# Patient Record
Sex: FEMALE | Race: WHITE | Employment: OTHER | URBAN - METROPOLITAN AREA
[De-identification: names, ages, dates, MRNs, and addresses within clinical notes are randomized per-mention and may not be internally consistent; named-entity substitution may affect disease eponyms.]

---

## 2021-04-29 ENCOUNTER — APPOINTMENT (OUTPATIENT)
Dept: CT IMAGING | Age: 81
End: 2021-04-29
Attending: NURSE PRACTITIONER
Payer: MEDICARE

## 2021-04-29 ENCOUNTER — HOSPITAL ENCOUNTER (EMERGENCY)
Age: 81
Discharge: HOME | End: 2021-04-29
Attending: EMERGENCY MEDICINE
Payer: MEDICARE

## 2021-04-29 ENCOUNTER — APPOINTMENT (OUTPATIENT)
Dept: NON INVASIVE DIAGNOSTICS | Age: 81
End: 2021-04-29
Attending: NURSE PRACTITIONER
Payer: MEDICARE

## 2021-04-29 VITALS
DIASTOLIC BLOOD PRESSURE: 83 MMHG | RESPIRATION RATE: 23 BRPM | OXYGEN SATURATION: 97 % | HEART RATE: 77 BPM | WEIGHT: 141 LBS | SYSTOLIC BLOOD PRESSURE: 106 MMHG | TEMPERATURE: 97.5 F

## 2021-04-29 DIAGNOSIS — R05.9 COUGH: ICD-10-CM

## 2021-04-29 DIAGNOSIS — R06.00 DYSPNEA, UNSPECIFIED TYPE: ICD-10-CM

## 2021-04-29 LAB
ALBUMIN SERPL-MCNC: 3.2 G/DL (ref 3.5–5)
ALBUMIN/GLOB SERPL: 0.7 {RATIO} (ref 1.1–2.2)
ALP SERPL-CCNC: 140 U/L (ref 45–117)
ALT SERPL-CCNC: 20 U/L (ref 12–78)
ANION GAP SERPL CALC-SCNC: 7 MMOL/L (ref 5–15)
AST SERPL-CCNC: 27 U/L (ref 15–37)
BASOPHILS # BLD: 0.2 K/UL (ref 0–0.1)
BASOPHILS NFR BLD: 3 % (ref 0–1)
BILIRUB SERPL-MCNC: 0.4 MG/DL (ref 0.2–1)
BNP SERPL-MCNC: 119 PG/ML
BUN SERPL-MCNC: 20 MG/DL (ref 6–20)
BUN/CREAT SERPL: 22 (ref 12–20)
CALCIUM SERPL-MCNC: 8.8 MG/DL (ref 8.5–10.1)
CHLORIDE SERPL-SCNC: 106 MMOL/L (ref 97–108)
CO2 SERPL-SCNC: 26 MMOL/L (ref 21–32)
COMMENT, HOLDF: NORMAL
CREAT SERPL-MCNC: 0.9 MG/DL (ref 0.55–1.02)
DIFFERENTIAL METHOD BLD: ABNORMAL
ECHO AO ASC DIAM: 2.81 CM
ECHO AO ROOT DIAM: 3.2 CM
ECHO AV AREA PEAK VELOCITY: 2.93 CM2
ECHO AV AREA VTI: 2.41 CM2
ECHO AV MEAN GRADIENT: 5.73 MMHG
ECHO AV PEAK GRADIENT: 8.83 MMHG
ECHO AV PEAK VELOCITY: 148.58 CM/S
ECHO AV VTI: 32.45 CM
ECHO IVC PROX: 1.71 CM
ECHO LA MAJOR AXIS: 2.64 CM
ECHO LA VOL 2C: 26.53 ML (ref 22–52)
ECHO LA VOL 4C: 30.44 ML (ref 22–52)
ECHO LA VOL BP: 33.17 ML (ref 22–52)
ECHO LV INTERNAL DIMENSION DIASTOLIC: 3.36 CM (ref 3.9–5.3)
ECHO LV INTERNAL DIMENSION SYSTOLIC: 2.45 CM
ECHO LV IVSD: 1.04 CM (ref 0.6–0.9)
ECHO LV MASS 2D: 103 G (ref 67–162)
ECHO LV POSTERIOR WALL DIASTOLIC: 1.04 CM (ref 0.6–0.9)
ECHO LVOT DIAM: 2.02 CM
ECHO LVOT PEAK GRADIENT: 7.33 MMHG
ECHO LVOT PEAK VELOCITY: 135.33 CM/S
ECHO LVOT SV: 78.4 ML
ECHO LVOT VTI: 24.36 CM
ECHO MV A VELOCITY: 74.8 CM/S
ECHO MV E DECELERATION TIME (DT): 270.83 MS
ECHO MV E VELOCITY: 62.83 CM/S
ECHO MV E/A RATIO: 0.84
ECHO PV PEAK INSTANTANEOUS GRADIENT SYSTOLIC: 4.88 MMHG
ECHO PV REGURGITANT END DIASTOLIC MAX VELOCITY: 79.44 CM/S
ECHO RV INTERNAL DIMENSION: 3.04 CM
ECHO TV REGURGITANT MAX VELOCITY: 246.96 CM/S
ECHO TV REGURGITANT PEAK GRADIENT: 24.4 MMHG
EOSINOPHIL # BLD: 0.1 K/UL (ref 0–0.4)
EOSINOPHIL NFR BLD: 2 % (ref 0–7)
ERYTHROCYTE [DISTWIDTH] IN BLOOD BY AUTOMATED COUNT: 13.2 % (ref 11.5–14.5)
GLOBULIN SER CALC-MCNC: 4.5 G/DL (ref 2–4)
GLUCOSE SERPL-MCNC: 95 MG/DL (ref 65–100)
HCT VFR BLD AUTO: 40.3 % (ref 35–47)
HGB BLD-MCNC: 13.1 G/DL (ref 11.5–16)
IMM GRANULOCYTES # BLD AUTO: 0 K/UL
IMM GRANULOCYTES NFR BLD AUTO: 0 %
LVOT MG: 4.15 MMHG
LYMPHOCYTES # BLD: 1.5 K/UL (ref 0.8–3.5)
LYMPHOCYTES NFR BLD: 29 % (ref 12–49)
MCH RBC QN AUTO: 31 PG (ref 26–34)
MCHC RBC AUTO-ENTMCNC: 32.5 G/DL (ref 30–36.5)
MCV RBC AUTO: 95.3 FL (ref 80–99)
MONOCYTES # BLD: 0.5 K/UL (ref 0–1)
MONOCYTES NFR BLD: 10 % (ref 5–13)
NEUTS SEG # BLD: 2.8 K/UL (ref 1.8–8)
NEUTS SEG NFR BLD: 56 % (ref 32–75)
NRBC # BLD: 0 K/UL (ref 0–0.01)
NRBC BLD-RTO: 0 PER 100 WBC
PLATELET # BLD AUTO: 269 K/UL (ref 150–400)
PMV BLD AUTO: 9.3 FL (ref 8.9–12.9)
POTASSIUM SERPL-SCNC: 4 MMOL/L (ref 3.5–5.1)
PROT SERPL-MCNC: 7.7 G/DL (ref 6.4–8.2)
RBC # BLD AUTO: 4.23 M/UL (ref 3.8–5.2)
RBC MORPH BLD: ABNORMAL
SAMPLES BEING HELD,HOLD: NORMAL
SODIUM SERPL-SCNC: 139 MMOL/L (ref 136–145)
TROPONIN I SERPL-MCNC: <0.05 NG/ML
WBC # BLD AUTO: 5.1 K/UL (ref 3.6–11)
WBC MORPH BLD: ABNORMAL

## 2021-04-29 PROCEDURE — 71275 CT ANGIOGRAPHY CHEST: CPT

## 2021-04-29 PROCEDURE — 84484 ASSAY OF TROPONIN QUANT: CPT

## 2021-04-29 PROCEDURE — 93306 TTE W/DOPPLER COMPLETE: CPT

## 2021-04-29 PROCEDURE — 80053 COMPREHEN METABOLIC PANEL: CPT

## 2021-04-29 PROCEDURE — 83880 ASSAY OF NATRIURETIC PEPTIDE: CPT

## 2021-04-29 PROCEDURE — A9270 NON-COVERED ITEM OR SERVICE: HCPCS | Performed by: EMERGENCY MEDICINE

## 2021-04-29 PROCEDURE — 93005 ELECTROCARDIOGRAM TRACING: CPT

## 2021-04-29 PROCEDURE — 74011250636 HC RX REV CODE- 250/636: Performed by: NURSE PRACTITIONER

## 2021-04-29 PROCEDURE — 74011636637 HC RX REV CODE- 636/637: Performed by: EMERGENCY MEDICINE

## 2021-04-29 PROCEDURE — 96374 THER/PROPH/DIAG INJ IV PUSH: CPT

## 2021-04-29 PROCEDURE — 36415 COLL VENOUS BLD VENIPUNCTURE: CPT

## 2021-04-29 PROCEDURE — 99285 EMERGENCY DEPT VISIT HI MDM: CPT

## 2021-04-29 PROCEDURE — 85025 COMPLETE CBC W/AUTO DIFF WBC: CPT

## 2021-04-29 PROCEDURE — 99284 EMERGENCY DEPT VISIT MOD MDM: CPT | Performed by: INTERNAL MEDICINE

## 2021-04-29 PROCEDURE — 74011000636 HC RX REV CODE- 636: Performed by: RADIOLOGY

## 2021-04-29 RX ORDER — PREDNISONE 20 MG/1
60 TABLET ORAL
Status: COMPLETED | OUTPATIENT
Start: 2021-04-29 | End: 2021-04-29

## 2021-04-29 RX ORDER — METHYLPREDNISOLONE 4 MG/1
TABLET ORAL
Qty: 1 DOSE PACK | Refills: 0 | Status: SHIPPED | OUTPATIENT
Start: 2021-04-29

## 2021-04-29 RX ORDER — FUROSEMIDE 10 MG/ML
40 INJECTION INTRAMUSCULAR; INTRAVENOUS ONCE
Status: COMPLETED | OUTPATIENT
Start: 2021-04-29 | End: 2021-04-29

## 2021-04-29 RX ADMIN — PREDNISONE 60 MG: 20 TABLET ORAL at 16:33

## 2021-04-29 RX ADMIN — FUROSEMIDE 40 MG: 10 INJECTION, SOLUTION INTRAMUSCULAR; INTRAVENOUS at 14:22

## 2021-04-29 RX ADMIN — IOPAMIDOL 80 ML: 755 INJECTION, SOLUTION INTRAVENOUS at 11:46

## 2021-04-29 NOTE — ED TRIAGE NOTES
Pt arrives to ED after being seen and sent at [facility] for SOB, coughing x 4 weeks. Pt has been on Doxycycline  for 10 days for bronchitis, but does has no relief. Pt denies fever, chills, NVD.

## 2021-04-29 NOTE — ED NOTES
Verbal shift change report given to [facility] Lisa RN/Jonny RN (oncoming nurse) by Spring Curry RN (offgoing nurse). Report included the following information SBAR, Kardex, ED Summary, [facility] and Recent Results.

## 2021-04-29 NOTE — ED NOTES
Pt given discharge instructions and verbalized understanding. Pt ambulatory from ED to home with daughter as  of vehicle.

## 2021-04-29 NOTE — ED NOTES
10:59 AM  I have evaluated the patient as the Provider in Triage. I have reviewed Her vital signs and the triage nurse assessment. I have talked with the patient and any available family and advised that I am the provider in triage and have ordered the appropriate study to initiate their work up based on the clinical presentation during my assessment. I have advised that the patient will be accommodated in the Main ED as soon as possible. I have also requested to contact the triage nurse or myself immediately if the patient experiences any changes in their condition during this brief waiting period. Comes to the emergency room today for increased shortness of breath and cough. Patient has recently finished a 10-day course of doxycycline, been to [facility] twice and sent to the emergency room for evaluation regarding worsening symptoms. Denies chest pain, diaphoresis, fever or chills.   Twyla Cortez NP

## 2021-04-29 NOTE — ED PROVIDER NOTES
67-year-old female with a history of tobacco use of approximately 30 years ago presents with a chief complaint of cough and shortness of breath. The patient states that she has had the symptoms for the last 4 weeks. She went to an urgent care facility recently and completed a course of doxycycline which did not improve her symptoms. She denies any chest pain, fevers, chills, abdominal pain, GI or urinary symptoms. She has not had any unintentional weight loss recently. She does have a approximately 30-pack-year history of smoking. She has not had any recent CT imaging of her chest.           No past medical history on file. No past surgical history on file. No family history on file.     Social History     Socioeconomic History    Marital status: Not on file     Spouse name: Not on file    Number of children: Not on file    Years of education: Not on file    Highest education level: Not on file   Occupational History    Not on file   Social Needs    Financial resource strain: Not on file    Food insecurity     Worry: Not on file     Inability: Not on file    Transportation needs     Medical: Not on file     Non-medical: Not on file   Tobacco Use    Smoking status: Not on file   Substance and Sexual Activity    Alcohol use: Not on file    Drug use: Not on file    Sexual activity: Not on file   Lifestyle    Physical activity     Days per week: Not on file     Minutes per session: Not on file    Stress: Not on file   Relationships    Social connections     Talks on phone: Not on file     Gets together: Not on file     Attends [employer] service: Not on file     Active member of club or organization: Not on file     Attends meetings of clubs or organizations: Not on file     Relationship status: Not on file    Intimate partner violence     Fear of current or ex partner: Not on file     Emotionally abused: Not on file     Physically abused: Not on file     Forced sexual activity: Not on file Other Topics Concern    Not on file   Social History Narrative    Not on file         ALLERGIES: Cephalosporins    Review of Systems   Constitutional: Negative for fever. HENT: Negative for rhinorrhea. Respiratory: Positive for cough and shortness of breath. Cardiovascular: Negative for chest pain. Gastrointestinal: Negative for abdominal pain. Genitourinary: Negative for dysuria. Musculoskeletal: Negative for back pain. Skin: Negative for wound. Neurological: Negative for headaches. Psychiatric/Behavioral: Negative for confusion. Vitals:    04/29/21 1013 04/29/21 1025   BP: 113/71    Pulse: 89    Resp: 18    Temp: 97.5 °F (36.4 °C)    SpO2: 97% 98%   Weight: 64.3 kg (141 lb 12.1 oz)             Physical Exam  Vitals signs and nursing note reviewed. Constitutional:       General: She is not in acute distress. Appearance: Normal appearance. She is well-developed. She is not ill-appearing, toxic-appearing or diaphoretic. HENT:      Head: Normocephalic and atraumatic. Eyes:      Extraocular Movements: Extraocular movements intact. Neck:      Musculoskeletal: Normal range of motion. Cardiovascular:      Rate and Rhythm: Normal rate and regular rhythm. Pulses: Normal pulses. Heart sounds: Normal heart sounds. Pulmonary:      Effort: Pulmonary effort is normal. No respiratory distress. Breath sounds: Rales present. No decreased breath sounds, wheezing or rhonchi. Abdominal:      General: There is no distension. Palpations: Abdomen is soft. Tenderness: There is no abdominal tenderness. Musculoskeletal: Normal range of motion. Skin:     General: Skin is warm and dry. Neurological:      Mental Status: She is alert and oriented to person, place, and time.    Psychiatric:         Mood and Affect: Mood normal.          MDM  Number of Diagnoses or Management Options  Cough  Dyspnea, unspecified type  Diagnosis management comments: 71-year-old female presents with shortness of breath and cough. She does have a 30-pack-year history of smoking although this was 30 years ago. She does have minor rales on exam.  Other differentials include but not limited to congestive heart failure, pneumonia, PE, malignancy among others. CT of the chest was obtained and shows minimal bilateral interstitial edema with no evidence of malignancy or pulmonary embolism. Patient's BNP is normal.  Her laboratory studies are unremarkable and her EKG is nonischemic. I will consult cardiology to get their input regarding the possibility of congestive heart failure. Patient was evaluated by the cardiology nurse practitioner and an echo was ordered. The echo is pending at this time and patient will be signed out to the afternoon attending pending echo and disposition. EKG shows a sinus rhythm at a rate of 74, normal intervals, left axis deviation, no ischemic changes. 3 PM  Change of shift. Care of patient signed over to Dr. Enid Kang. Bedside handoff complete. Awaiting echo and dispo.           Amount and/or Complexity of Data Reviewed  Clinical lab tests: ordered and reviewed  Tests in the radiology section of CPT®: ordered and reviewed           Procedures

## 2021-04-29 NOTE — CONSULTS
[facility] [address] [id number]  Mobile [phone]    Cardiology Consult Note    Date of  Admission: 2021 10:16 AM  PCP- Gus Wakefield MD Other, MD Gus  :  1940   MRN:  [id number]     CC: SOB & cough  Reason for consult: CHF  Admission Diagnosis: No admission diagnoses are documented for this encounter. Efrain Ann is a 79 y.o. female admitted for No admission diagnoses are documented for this encounter. . Consult requested by Sathish Hillman MD       Subjective:   No admission diagnoses are documented for this encounter. Impression Plan/Recommendation   1. Pulmonary edema on CTA  2. Cough/SOB  3. Ex-Smoker (remote +30 years                · CTA of chest showing minimal pulmonary edema, normal BNP. Neg troponin. No PE  · ecg NSR, no acute St changes   · Will obtain echo  · Give one dose of lasix IV- crackles in les bases, clinically euvolemic on exam. No in acute HF  · If echo okay for d/c. Reviewed above plan w the patient, daughter, ER attending and Dr Marilee Bowens      Pt personally seen and examined. Chart reviewed. Agree with advanced NP's history, exam and  A/P with changes/additons. 79 yr old CF admitted for cough/dyspnea    Blood pressure 106/83, pulse 77, temperature 97.5 °F (36.4 °C), resp. rate 23, weight 141 lb (64 kg), SpO2 97 %. Neck-no JVD  CVS-S1-S2 present, no murmur      Trop neg; EKG- SR/NSTT    Clinically not hypervolemic    21   ECHO ADULT COMPLETE 2021    Narrative · LV: Estimated LVEF is 55 - 60%. Normal cavity size, wall thickness and   systolic function (ejection fraction normal). Wall motion: normal. Mild   (grade 1) left ventricular diastolic dysfunction. Signed by: Kassi Carrasco MD     Can be DC'd from cardiac standpoint.  Can f/u as OP on prn basis\        Discussed with patient/nursing/family    Heron Alatorre Jessica Barnhart MD, [facility] [place] [facility]        Subjective:  Ernestina Feliz is a 79 y.o. female I am seeing for CHF. PMhx includes ex smoker quit over 30 years ago. Patient presents w SOB and cough x 4 weeks. She was treated for bronchitis w doxycycline w no improvement her symptoms. No associated denies palpitations, irregular heart beat, chest pain or LE edema. Family hx: mother hypertrophic CM ( she did not have a ICD/no hx of CAD)  Social hx: smoker x 30 years quit over 30 years ago. Denies etoh. Lives w daughter. Independent- does all activities for daily living. Walks . No past medical history on file. No past surgical history on file. Hospital Medications:  No current facility-administered medications for this encounter. No current outpatient medications on file. No current facility-administered medications on file prior to encounter. No current outpatient medications on file prior to encounter. Allergies   Allergen Reactions    Cephalosporins Hives             Review of Symptoms:  All other systems reviewed are negative except as above. Physical Exam    Visit Vitals  /83   Pulse 71   Temp 97.5 °F (36.4 °C)   Resp 23   Wt 141 lb 12.1 oz (64.3 kg)   SpO2 97%     General Appearance:  Well developed, well nourished,alert and oriented x 3, and individual in no acute distress. Ears/Nose/Mouth/Throat:   Hearing grossly normal.Normal oral mucosa,no scleral icterus     Neck: Supple no JVD    Chest:   Crackles les bases, congested cough   Cardiovascular:  Regular rate and rhythm, no Murmur. Abdomen:   Soft, non-tender, bowel sounds are active. Extremities: No edema bilaterally. Pulses detected, no varicosities   Skin: Warm and dry.  No bruising  Neuro  Moves all extermities and neurologically intact                                                       Cardiographics    Telemetry: NSR  ECG: as above    Cardiac testing      Recent Labs     04/29/21  1044   TROIQ <0.05 Recent Labs     04/29/21  1044      K 4.0   CO2 26   BUN 20   CREA 0.90   GLU 95   WBC 5.1   HGB 13.1   HCT 40.3          I have discussed the diagnosis with the patient and the intended plan as seen in the above orders. Questions were answered concerning future plans. I have discussed medication side effects and warnings with the patient as well. Giuseppe Neumann is in agreement to the plan listed above and wishes to proceed. she  was instructed not to smoke, eat heart healthy diet  and to exercise. Thank you for this consult.     Mata Wells NP

## 2021-04-29 NOTE — ED NOTES
I took over care from Dr. Stevo Mancera. Patient had a CTA that was negative for PE. No evidence of acute coronary syndrome. There was some fluid seen on the CT, so cardiology was involved and ordered a stat echo. The nurse practitioner, Elder Sutton, from cardiology called to tell me the echo was reassuring. From a cardiology standpoint the patient was cleared and would not need further work-up. She is breathing well, but has occasional cough. She has been on antibiotics, but does not seem to be a bacterial infection. She has been vaccinated for Covid for a few months. She has a smoking history, but has not been on any steroids. Endometritis to see if it helps with some of the inflammation. Have also referred her to pulmonology and her primary care doctor. We discussed admission, but the patient was not interested and I also think she should be able to get things done as an outpatient. I do not think she absolutely has to be admitted and I expressed this thoughts to her and her daughter. Made clear that if her breathing gets worse or she has any chest pain or something seems wrong to return to the emergency department immediately.

## 2021-04-30 LAB
ATRIAL RATE: 74 BPM
CALCULATED P AXIS, ECG09: 61 DEGREES
CALCULATED R AXIS, ECG10: -59 DEGREES
CALCULATED T AXIS, ECG11: 54 DEGREES
DIAGNOSIS, 93000: NORMAL
P-R INTERVAL, ECG05: 182 MS
Q-T INTERVAL, ECG07: 390 MS
QRS DURATION, ECG06: 82 MS
QTC CALCULATION (BEZET), ECG08: 432 MS
VENTRICULAR RATE, ECG03: 74 BPM

## 2023-05-15 RX ORDER — METHYLPREDNISOLONE 4 MG/1
TABLET ORAL
COMMUNITY
Start: 2021-04-29

## 2025-05-07 ENCOUNTER — TRANSCRIBE ORDERS (OUTPATIENT)
Facility: HOSPITAL | Age: 85
End: 2025-05-07

## 2025-05-07 ENCOUNTER — HOSPITAL ENCOUNTER (OUTPATIENT)
Facility: HOSPITAL | Age: 85
Discharge: HOME | End: 2025-05-10
Payer: MEDICARE

## 2025-05-07 DIAGNOSIS — R05.9 COUGH, UNSPECIFIED TYPE: ICD-10-CM

## 2025-05-07 DIAGNOSIS — R05.9 COUGH, UNSPECIFIED TYPE: Primary | ICD-10-CM

## 2025-05-07 PROCEDURE — 71046 X-RAY EXAM CHEST 2 VIEWS: CPT
